# Patient Record
Sex: FEMALE | Race: AMERICAN INDIAN OR ALASKA NATIVE | NOT HISPANIC OR LATINO | URBAN - METROPOLITAN AREA
[De-identification: names, ages, dates, MRNs, and addresses within clinical notes are randomized per-mention and may not be internally consistent; named-entity substitution may affect disease eponyms.]

---

## 2021-05-08 ENCOUNTER — EMERGENCY (EMERGENCY)
Facility: HOSPITAL | Age: 36
LOS: 1 days | Discharge: HOME | End: 2021-05-08
Attending: EMERGENCY MEDICINE | Admitting: EMERGENCY MEDICINE
Payer: COMMERCIAL

## 2021-05-08 VITALS
HEART RATE: 78 BPM | OXYGEN SATURATION: 100 % | RESPIRATION RATE: 18 BRPM | SYSTOLIC BLOOD PRESSURE: 117 MMHG | DIASTOLIC BLOOD PRESSURE: 74 MMHG | TEMPERATURE: 97 F

## 2021-05-08 LAB — SARS-COV-2 RNA SPEC QL NAA+PROBE: SIGNIFICANT CHANGE UP

## 2021-05-08 PROCEDURE — 99284 EMERGENCY DEPT VISIT MOD MDM: CPT

## 2021-05-08 NOTE — ED ADULT TRIAGE NOTE - CHIEF COMPLAINT QUOTE
Arrives from home c/o nasal congestion associated with seasonal allergies unrelieved by claritin. Did not see PCP. Denies PMH

## 2021-05-08 NOTE — ED PROVIDER NOTE - PATIENT PORTAL LINK FT
You can access the FollowMyHealth Patient Portal offered by [facility] by registering at the following website: [email or web address]. By joining [employer]’s FollowMyHealth portal, you will also be able to view your health information using other applications (apps) compatible with our system.

## 2021-05-08 NOTE — ED PROVIDER NOTE - OBJECTIVE STATEMENT
35yo F no sig pmhx pw diffuse weakness, malaise, congestion x one week. Today went to work and felt "weak" "only wanted to rest"   no fever/chills. no n/v/d no dysuria. "itchy eyes" at night. Taking claritin at home, "it feels like my seasonal allergies" no cough. no cp or sob  LMP 4-29-21 denies preg. Had covid and covid vaccine. Denies any change in taste.

## 2021-05-08 NOTE — ED PROVIDER NOTE - PHYSICAL EXAMINATION
Well appearing female.   mmm. non icteric. no jaundice  normal S1-S2  No resp distress. able to speak in full and clear sentences. no wheeze, rales or stridor.  soft nontender abdomen. no  rebound. no guarding. no sign of trauma. no CVAT   no pedal edema. no calf tenderness. normal pulses bilateral feet.

## 2021-05-08 NOTE — ED PROVIDER NOTE - PROGRESS NOTE DETAILS
plan urine hcg, and covid test. dc home w close fu. Pt requesting work note, given. pt comfortable w plan

## 2021-05-08 NOTE — ED ADULT NURSE NOTE - OBJECTIVE STATEMENT
pt received to intake 1, a&ox 4, ambulatory, denies pmh, p/w seasonal allergies unrelieved by Claritin. Pt reports "runny nose," nasal congestion, itchy/watering eyes. Pt breathing even and unlabored on room air. Denies fever, SOB, chest pain, palpitations, dizziness, N/V/D, constipation, numbness, tingling. COVID swab collected and sent. UCG negative. pending dispo.

## 2021-08-16 ENCOUNTER — RESULT REVIEW (OUTPATIENT)
Age: 36
End: 2021-08-16

## 2021-08-17 PROBLEM — Z00.00 ENCOUNTER FOR PREVENTIVE HEALTH EXAMINATION: Status: ACTIVE | Noted: 2021-08-17

## 2021-09-03 ENCOUNTER — ASOB RESULT (OUTPATIENT)
Age: 36
End: 2021-09-03

## 2021-09-03 ENCOUNTER — APPOINTMENT (OUTPATIENT)
Dept: MATERNAL FETAL MEDICINE | Facility: CLINIC | Age: 36
End: 2021-09-03
Payer: COMMERCIAL

## 2021-09-03 DIAGNOSIS — O99.810 ABNORMAL GLUCOSE COMPLICATING PREGNANCY: ICD-10-CM

## 2021-09-03 PROCEDURE — G0108 DIAB MANAGE TRN  PER INDIV: CPT | Mod: 95

## 2021-09-03 RX ORDER — BLOOD-GLUCOSE METER
W/DEVICE KIT MISCELLANEOUS
Qty: 1 | Refills: 0 | Status: ACTIVE | COMMUNITY
Start: 2021-09-03 | End: 1900-01-01

## 2021-09-09 ENCOUNTER — APPOINTMENT (OUTPATIENT)
Dept: MATERNAL FETAL MEDICINE | Facility: CLINIC | Age: 36
End: 2021-09-09
Payer: COMMERCIAL

## 2021-09-09 ENCOUNTER — APPOINTMENT (OUTPATIENT)
Dept: PEDIATRIC MEDICAL GENETICS | Facility: CLINIC | Age: 36
End: 2021-09-09
Payer: COMMERCIAL

## 2021-09-09 ENCOUNTER — ASOB RESULT (OUTPATIENT)
Age: 36
End: 2021-09-09

## 2021-09-09 DIAGNOSIS — Z78.9 OTHER SPECIFIED HEALTH STATUS: ICD-10-CM

## 2021-09-09 DIAGNOSIS — Z83.49 FAMILY HISTORY OF OTHER ENDOCRINE, NUTRITIONAL AND METABOLIC DISEASES: ICD-10-CM

## 2021-09-09 DIAGNOSIS — Z83.3 FAMILY HISTORY OF DIABETES MELLITUS: ICD-10-CM

## 2021-09-09 DIAGNOSIS — O09.521 SUPERVISION OF ELDERLY MULTIGRAVIDA, FIRST TRIMESTER: ICD-10-CM

## 2021-09-09 PROCEDURE — 96040: CPT

## 2021-09-09 PROCEDURE — G0108 DIAB MANAGE TRN  PER INDIV: CPT | Mod: 95

## 2021-09-20 ENCOUNTER — NON-APPOINTMENT (OUTPATIENT)
Age: 36
End: 2021-09-20

## 2021-09-20 ENCOUNTER — APPOINTMENT (OUTPATIENT)
Dept: MATERNAL FETAL MEDICINE | Facility: CLINIC | Age: 36
End: 2021-09-20
Payer: COMMERCIAL

## 2021-09-20 PROCEDURE — G0108 DIAB MANAGE TRN  PER INDIV: CPT | Mod: 95

## 2021-09-22 ENCOUNTER — ASOB RESULT (OUTPATIENT)
Age: 36
End: 2021-09-22

## 2021-09-22 ENCOUNTER — APPOINTMENT (OUTPATIENT)
Dept: ANTEPARTUM | Facility: CLINIC | Age: 36
End: 2021-09-22
Payer: COMMERCIAL

## 2021-09-22 PROCEDURE — 76801 OB US < 14 WKS SINGLE FETUS: CPT

## 2021-09-22 PROCEDURE — 76813 OB US NUCHAL MEAS 1 GEST: CPT

## 2021-09-22 PROCEDURE — 36416 COLLJ CAPILLARY BLOOD SPEC: CPT

## 2021-09-23 ENCOUNTER — NON-APPOINTMENT (OUTPATIENT)
Age: 36
End: 2021-09-23

## 2021-09-27 ENCOUNTER — APPOINTMENT (OUTPATIENT)
Dept: ANTEPARTUM | Facility: CLINIC | Age: 36
End: 2021-09-27
Payer: COMMERCIAL

## 2021-09-27 ENCOUNTER — ASOB RESULT (OUTPATIENT)
Age: 36
End: 2021-09-27

## 2021-09-27 PROCEDURE — 99213 OFFICE O/P EST LOW 20 MIN: CPT | Mod: 95

## 2021-09-30 ENCOUNTER — NON-APPOINTMENT (OUTPATIENT)
Age: 36
End: 2021-09-30

## 2021-10-19 ENCOUNTER — ASOB RESULT (OUTPATIENT)
Age: 36
End: 2021-10-19

## 2021-10-19 ENCOUNTER — APPOINTMENT (OUTPATIENT)
Dept: MATERNAL FETAL MEDICINE | Facility: CLINIC | Age: 36
End: 2021-10-19
Payer: COMMERCIAL

## 2021-10-19 PROCEDURE — G0108 DIAB MANAGE TRN  PER INDIV: CPT | Mod: 95

## 2021-10-25 ENCOUNTER — NON-APPOINTMENT (OUTPATIENT)
Age: 36
End: 2021-10-25

## 2021-11-02 ENCOUNTER — APPOINTMENT (OUTPATIENT)
Dept: PEDIATRIC CARDIOLOGY | Facility: CLINIC | Age: 36
End: 2021-11-02
Payer: COMMERCIAL

## 2021-11-02 PROCEDURE — 76821 MIDDLE CEREBRAL ARTERY ECHO: CPT

## 2021-11-02 PROCEDURE — 99202 OFFICE O/P NEW SF 15 MIN: CPT

## 2021-11-02 PROCEDURE — 76827 ECHO EXAM OF FETAL HEART: CPT

## 2021-11-02 PROCEDURE — 93325 DOPPLER ECHO COLOR FLOW MAPG: CPT | Mod: 59

## 2021-11-02 PROCEDURE — 76820 UMBILICAL ARTERY ECHO: CPT

## 2021-11-02 PROCEDURE — 76825 ECHO EXAM OF FETAL HEART: CPT

## 2021-11-09 ENCOUNTER — ASOB RESULT (OUTPATIENT)
Age: 36
End: 2021-11-09

## 2021-11-09 ENCOUNTER — APPOINTMENT (OUTPATIENT)
Dept: MATERNAL FETAL MEDICINE | Facility: CLINIC | Age: 36
End: 2021-11-09
Payer: COMMERCIAL

## 2021-11-09 PROCEDURE — G0108 DIAB MANAGE TRN  PER INDIV: CPT | Mod: 95

## 2021-11-09 RX ORDER — BLOOD-GLUCOSE METER
KIT MISCELLANEOUS
Qty: 6 | Refills: 1 | Status: ACTIVE | COMMUNITY
Start: 2021-09-03 | End: 1900-01-01

## 2021-11-17 ENCOUNTER — ASOB RESULT (OUTPATIENT)
Age: 36
End: 2021-11-17

## 2021-11-17 ENCOUNTER — APPOINTMENT (OUTPATIENT)
Dept: ANTEPARTUM | Facility: CLINIC | Age: 36
End: 2021-11-17
Payer: COMMERCIAL

## 2021-11-17 PROCEDURE — 76811 OB US DETAILED SNGL FETUS: CPT

## 2021-11-18 DIAGNOSIS — O24.419 GESTATIONAL DIABETES MELLITUS IN PREGNANCY, UNSPECIFIED CONTROL: ICD-10-CM

## 2021-11-18 RX ORDER — PEN NEEDLE, DIABETIC 29 G X1/2"
32G X 4 MM NEEDLE, DISPOSABLE MISCELLANEOUS
Qty: 4 | Refills: 3 | Status: COMPLETED | COMMUNITY
Start: 2021-09-09 | End: 2022-11-13

## 2021-11-22 ENCOUNTER — NON-APPOINTMENT (OUTPATIENT)
Age: 36
End: 2021-11-22

## 2021-11-22 RX ORDER — INSULIN HUMAN 100 [IU]/ML
100 INJECTION, SUSPENSION SUBCUTANEOUS
Qty: 2 | Refills: 1 | Status: ACTIVE | COMMUNITY
Start: 2021-09-09 | End: 1900-01-01

## 2021-12-06 ENCOUNTER — APPOINTMENT (OUTPATIENT)
Dept: MATERNAL FETAL MEDICINE | Facility: CLINIC | Age: 36
End: 2021-12-06
Payer: COMMERCIAL

## 2021-12-06 ENCOUNTER — ASOB RESULT (OUTPATIENT)
Age: 36
End: 2021-12-06

## 2021-12-06 PROCEDURE — G0108 DIAB MANAGE TRN  PER INDIV: CPT | Mod: 95

## 2021-12-06 RX ORDER — LANCETS 33 GAUGE
EACH MISCELLANEOUS
Qty: 2 | Refills: 1 | Status: ACTIVE | COMMUNITY
Start: 2021-09-03 | End: 1900-01-01

## 2021-12-06 RX ORDER — ISOPROPYL ALCOHOL 0.7 ML/ML
SWAB TOPICAL
Qty: 2 | Refills: 3 | Status: ACTIVE | COMMUNITY
Start: 2021-09-09 | End: 1900-01-01

## 2021-12-20 ENCOUNTER — APPOINTMENT (OUTPATIENT)
Dept: MATERNAL FETAL MEDICINE | Facility: CLINIC | Age: 36
End: 2021-12-20
Payer: COMMERCIAL

## 2021-12-20 ENCOUNTER — ASOB RESULT (OUTPATIENT)
Age: 36
End: 2021-12-20

## 2021-12-20 PROCEDURE — G0108 DIAB MANAGE TRN  PER INDIV: CPT | Mod: 95

## 2021-12-28 ENCOUNTER — OUTPATIENT (OUTPATIENT)
Dept: INPATIENT UNIT | Facility: HOSPITAL | Age: 36
LOS: 1 days | Discharge: HOME | End: 2021-12-28
Payer: COMMERCIAL

## 2021-12-28 ENCOUNTER — EMERGENCY (EMERGENCY)
Facility: HOSPITAL | Age: 36
LOS: 1 days | Discharge: STILL ADMITTED OR ADMINISTRATIVE | End: 2021-12-28
Admitting: EMERGENCY MEDICINE
Payer: COMMERCIAL

## 2021-12-28 VITALS
TEMPERATURE: 98 F | SYSTOLIC BLOOD PRESSURE: 109 MMHG | OXYGEN SATURATION: 97 % | RESPIRATION RATE: 20 BRPM | HEART RATE: 85 BPM | DIASTOLIC BLOOD PRESSURE: 66 MMHG

## 2021-12-28 VITALS — SYSTOLIC BLOOD PRESSURE: 111 MMHG | HEART RATE: 78 BPM | DIASTOLIC BLOOD PRESSURE: 67 MMHG

## 2021-12-28 VITALS
SYSTOLIC BLOOD PRESSURE: 113 MMHG | DIASTOLIC BLOOD PRESSURE: 71 MMHG | RESPIRATION RATE: 16 BRPM | TEMPERATURE: 99 F | HEART RATE: 81 BPM

## 2021-12-28 DIAGNOSIS — Z3A.00 WEEKS OF GESTATION OF PREGNANCY NOT SPECIFIED: ICD-10-CM

## 2021-12-28 DIAGNOSIS — O26.899 OTHER SPECIFIED PREGNANCY RELATED CONDITIONS, UNSPECIFIED TRIMESTER: ICD-10-CM

## 2021-12-28 LAB
APPEARANCE UR: CLEAR — SIGNIFICANT CHANGE UP
BACTERIA # UR AUTO: NEGATIVE — SIGNIFICANT CHANGE UP
BILIRUB UR-MCNC: NEGATIVE — SIGNIFICANT CHANGE UP
COLOR SPEC: YELLOW — SIGNIFICANT CHANGE UP
DIFF PNL FLD: NEGATIVE — SIGNIFICANT CHANGE UP
GLUCOSE UR QL: NEGATIVE — SIGNIFICANT CHANGE UP
KETONES UR-MCNC: ABNORMAL
LEUKOCYTE ESTERASE UR-ACNC: NEGATIVE — SIGNIFICANT CHANGE UP
NITRITE UR-MCNC: NEGATIVE — SIGNIFICANT CHANGE UP
PH UR: 6 — SIGNIFICANT CHANGE UP (ref 5–8)
PROT UR-MCNC: ABNORMAL
RBC CASTS # UR COMP ASSIST: 1 /HPF — SIGNIFICANT CHANGE UP (ref 0–4)
SP GR SPEC: 1.02 — SIGNIFICANT CHANGE UP (ref 1–1.05)
UROBILINOGEN FLD QL: SIGNIFICANT CHANGE UP
WBC UR QL: 1 /HPF — SIGNIFICANT CHANGE UP (ref 0–5)

## 2021-12-28 PROCEDURE — 99214 OFFICE O/P EST MOD 30 MIN: CPT | Mod: 25

## 2021-12-28 PROCEDURE — 76818 FETAL BIOPHYS PROFILE W/NST: CPT | Mod: 26

## 2021-12-28 PROCEDURE — L9996: CPT

## 2021-12-28 PROCEDURE — 76817 TRANSVAGINAL US OBSTETRIC: CPT | Mod: 26

## 2021-12-28 NOTE — OB PROVIDER TRIAGE NOTE - NSHPLABSRESULTS_GEN_ALL_CORE
Urinalysis Basic - ( 28 Dec 2021 16:52 )    Color: Yellow / Appearance: Clear / S.020 / pH: x  Gluc: x / Ketone: Large  / Bili: Negative / Urobili: <2 mg/dL   Blood: x / Protein: Trace / Nitrite: Negative   Leuk Esterase: Negative / RBC: 1 /HPF / WBC 1 /HPF   Sq Epi: x / Non Sq Epi: x / Bacteria: Negative

## 2021-12-28 NOTE — OB PROVIDER TRIAGE NOTE - HISTORY OF PRESENT ILLNESS
36 y.o.  FLAQUITO 3/31/2022 @ 26.5 weeks presents with c/o intermittent lower abdominal pain yesterday, now resolved. Pt denies LOF, VB, ctx. States +FM. Antepartum course complicated by pregestational DM2. Pt states occasional constipation. BM yesterday 2021. Denies sexual intercourse in past 24 hours.    allergies:  NKDA  medications:  prenatal vitamins  lispro - 20 units breakfast, 40 units lunch  humulin 22 units HS    med/surg hx:  top x 2  DM2    OBGYN hx:  top x 2 with d+c  top x 1 medical  2005  7lbs  2007  10 lbs

## 2021-12-28 NOTE — OB PROVIDER TRIAGE NOTE - NS_SONOPERFORMEDBY_OBGYN_ALL_OB_FT
8/1/16: LSIL, + HR HPV (Neg 16/18). Plan colp  12/13/16: [place] not done. Tracking updated for 6 mo pap.   07/18/17 Would consider patient to be lost to follow-up.  3/15/19 ASCUS pap, Neg HPV. Plan colp. (MRA)  3/25/19 Pt notified. (shelli)  4/9/19 [place] Bx & ECC - Negative. Plan cotest in 1 year. (shelli)  4/13/19 Pt viewed result on [facility] (shelli)  2/6/20 NIL Pap, Neg HPV. Plan cotest in 3 years. (shelli)  2/13/20 Result released to [facility]. (shelli)   Maritza, NP

## 2021-12-28 NOTE — OB PROVIDER TRIAGE NOTE - NSHPPHYSICALEXAM_GEN_ALL_CORE
abdomen soft, nontender  taus: sliup, breech presentation, posterior placenta, bpp 8/8, regi 11.83  sse: cervix appears closed, FFN collected and pending  tvus: cervical length 4.4, no dynamic changes/funneling noted  nst in progress

## 2021-12-28 NOTE — OB PROVIDER TRIAGE NOTE - NSOBPROVIDERNOTE_OBGYN_ALL_OB_FT
a/p: 36 y.o.  FLAQUITO 3/31/2022 @ 26.5 weeks hx lower abdominal cramping now resolved    UA pending      Discussed with Dr Cesar. Plan for discharge home with  labor precautions/fetal kick counts reviewed. Encouraged increased PO hydration. F/U next scheduled prenatal appointment.     Maritza, NP a/p: 36 y.o.  FLAQUITO 3/31/2022 @ 26.5 weeks hx lower abdominal cramping now resolved    UA pending      Discussed with Dr Cesar. Plan for discharge home with  labor precautions/fetal kick counts reviewed. Encouraged increased PO hydration. F/U next scheduled prenatal appointment. Pt states understanding of the above plan. In total ~1 hour spent with established/new patient.    Maritza, NP

## 2021-12-28 NOTE — OB RN TRIAGE NOTE - FALL HARM RISK - UNIVERSAL INTERVENTIONS
Bed in lowest position, wheels locked, appropriate side rails in place/Call bell, personal items and telephone in reach/Instruct patient to call for assistance before getting out of bed or chair/Non-slip footwear when patient is out of bed/[place] to call system/Physically safe environment - no spills, clutter or unnecessary equipment/Purposeful Proactive Rounding/Room/bathroom lighting operational, light cord in reach

## 2022-01-03 ENCOUNTER — ASOB RESULT (OUTPATIENT)
Age: 37
End: 2022-01-03

## 2022-01-03 ENCOUNTER — APPOINTMENT (OUTPATIENT)
Dept: MATERNAL FETAL MEDICINE | Facility: CLINIC | Age: 37
End: 2022-01-03
Payer: COMMERCIAL

## 2022-01-03 PROCEDURE — G0108 DIAB MANAGE TRN  PER INDIV: CPT | Mod: 95

## 2022-01-04 ENCOUNTER — TRANSCRIPTION ENCOUNTER (OUTPATIENT)
Age: 37
End: 2022-01-04

## 2022-01-04 PROBLEM — E11.9 TYPE 2 DIABETES MELLITUS WITHOUT COMPLICATIONS: Chronic | Status: ACTIVE | Noted: 2021-12-28

## 2022-01-12 ENCOUNTER — ASOB RESULT (OUTPATIENT)
Age: 37
End: 2022-01-12

## 2022-01-12 ENCOUNTER — APPOINTMENT (OUTPATIENT)
Dept: ANTEPARTUM | Facility: CLINIC | Age: 37
End: 2022-01-12
Payer: COMMERCIAL

## 2022-01-12 PROCEDURE — 76816 OB US FOLLOW-UP PER FETUS: CPT

## 2022-01-12 PROCEDURE — 76819 FETAL BIOPHYS PROFIL W/O NST: CPT

## 2022-01-13 ENCOUNTER — ASOB RESULT (OUTPATIENT)
Age: 37
End: 2022-01-13

## 2022-01-13 ENCOUNTER — APPOINTMENT (OUTPATIENT)
Dept: MATERNAL FETAL MEDICINE | Facility: CLINIC | Age: 37
End: 2022-01-13
Payer: COMMERCIAL

## 2022-01-13 PROCEDURE — G0108 DIAB MANAGE TRN  PER INDIV: CPT | Mod: 95

## 2022-01-27 ENCOUNTER — ASOB RESULT (OUTPATIENT)
Age: 37
End: 2022-01-27

## 2022-01-27 ENCOUNTER — APPOINTMENT (OUTPATIENT)
Dept: MATERNAL FETAL MEDICINE | Facility: CLINIC | Age: 37
End: 2022-01-27
Payer: COMMERCIAL

## 2022-01-27 PROCEDURE — G0108 DIAB MANAGE TRN  PER INDIV: CPT | Mod: 95

## 2022-02-14 ENCOUNTER — ASOB RESULT (OUTPATIENT)
Age: 37
End: 2022-02-14

## 2022-02-14 ENCOUNTER — APPOINTMENT (OUTPATIENT)
Dept: MATERNAL FETAL MEDICINE | Facility: CLINIC | Age: 37
End: 2022-02-14

## 2022-02-16 RX ORDER — INSULIN LISPRO 100 [IU]/ML
100 INJECTION, SOLUTION INTRAVENOUS; SUBCUTANEOUS
Qty: 7 | Refills: 1 | Status: ACTIVE | COMMUNITY
Start: 2021-09-20 | End: 1900-01-01

## 2022-02-23 ENCOUNTER — APPOINTMENT (OUTPATIENT)
Dept: ANTEPARTUM | Facility: CLINIC | Age: 37
End: 2022-02-23
Payer: COMMERCIAL

## 2022-02-23 ENCOUNTER — ASOB RESULT (OUTPATIENT)
Age: 37
End: 2022-02-23

## 2022-02-23 PROCEDURE — 76818 FETAL BIOPHYS PROFILE W/NST: CPT

## 2022-02-23 PROCEDURE — 76816 OB US FOLLOW-UP PER FETUS: CPT

## 2022-02-28 ENCOUNTER — APPOINTMENT (OUTPATIENT)
Dept: MATERNAL FETAL MEDICINE | Facility: CLINIC | Age: 37
End: 2022-02-28
Payer: COMMERCIAL

## 2022-02-28 ENCOUNTER — APPOINTMENT (OUTPATIENT)
Dept: ANTEPARTUM | Facility: CLINIC | Age: 37
End: 2022-02-28
Payer: COMMERCIAL

## 2022-02-28 ENCOUNTER — ASOB RESULT (OUTPATIENT)
Age: 37
End: 2022-02-28

## 2022-02-28 PROCEDURE — 76818 FETAL BIOPHYS PROFILE W/NST: CPT

## 2022-02-28 PROCEDURE — G0108 DIAB MANAGE TRN  PER INDIV: CPT | Mod: 95

## 2022-03-07 ENCOUNTER — APPOINTMENT (OUTPATIENT)
Dept: ANTEPARTUM | Facility: CLINIC | Age: 37
End: 2022-03-07
Payer: COMMERCIAL

## 2022-03-07 ENCOUNTER — ASOB RESULT (OUTPATIENT)
Age: 37
End: 2022-03-07

## 2022-03-07 PROCEDURE — 76819 FETAL BIOPHYS PROFIL W/O NST: CPT

## 2022-03-10 ENCOUNTER — APPOINTMENT (OUTPATIENT)
Dept: ANTEPARTUM | Facility: CLINIC | Age: 37
End: 2022-03-10
Payer: COMMERCIAL

## 2022-03-10 ENCOUNTER — APPOINTMENT (OUTPATIENT)
Dept: MATERNAL FETAL MEDICINE | Facility: CLINIC | Age: 37
End: 2022-03-10

## 2022-03-10 ENCOUNTER — ASOB RESULT (OUTPATIENT)
Age: 37
End: 2022-03-10

## 2022-03-10 PROCEDURE — 59025 FETAL NON-STRESS TEST: CPT

## 2022-03-14 ENCOUNTER — ASOB RESULT (OUTPATIENT)
Age: 37
End: 2022-03-14

## 2022-03-14 ENCOUNTER — APPOINTMENT (OUTPATIENT)
Dept: ANTEPARTUM | Facility: CLINIC | Age: 37
End: 2022-03-14
Payer: COMMERCIAL

## 2022-03-14 PROCEDURE — 76818 FETAL BIOPHYS PROFILE W/NST: CPT

## 2022-03-17 ENCOUNTER — ASOB RESULT (OUTPATIENT)
Age: 37
End: 2022-03-17

## 2022-03-17 ENCOUNTER — APPOINTMENT (OUTPATIENT)
Dept: ANTEPARTUM | Facility: CLINIC | Age: 37
End: 2022-03-17
Payer: COMMERCIAL

## 2022-03-17 PROCEDURE — 59025 FETAL NON-STRESS TEST: CPT

## 2022-03-21 ENCOUNTER — ASOB RESULT (OUTPATIENT)
Age: 37
End: 2022-03-21

## 2022-03-21 ENCOUNTER — APPOINTMENT (OUTPATIENT)
Dept: ANTEPARTUM | Facility: CLINIC | Age: 37
End: 2022-03-21
Payer: COMMERCIAL

## 2022-03-21 PROCEDURE — 76818 FETAL BIOPHYS PROFILE W/NST: CPT

## 2022-03-21 PROCEDURE — 76816 OB US FOLLOW-UP PER FETUS: CPT

## 2022-03-22 ENCOUNTER — ASOB RESULT (OUTPATIENT)
Age: 37
End: 2022-03-22

## 2022-03-22 ENCOUNTER — APPOINTMENT (OUTPATIENT)
Dept: MATERNAL FETAL MEDICINE | Facility: CLINIC | Age: 37
End: 2022-03-22
Payer: COMMERCIAL

## 2022-03-22 PROCEDURE — G0108 DIAB MANAGE TRN  PER INDIV: CPT | Mod: 95

## 2022-03-24 ENCOUNTER — INPATIENT (INPATIENT)
Facility: HOSPITAL | Age: 37
LOS: 2 days | Discharge: HOME | End: 2022-03-27
Attending: STUDENT IN AN ORGANIZED HEALTH CARE EDUCATION/TRAINING PROGRAM | Admitting: STUDENT IN AN ORGANIZED HEALTH CARE EDUCATION/TRAINING PROGRAM
Payer: COMMERCIAL

## 2022-03-24 VITALS — TEMPERATURE: 98 F

## 2022-03-24 DIAGNOSIS — O24.410 GESTATIONAL DIABETES MELLITUS IN PREGNANCY, DIET CONTROLLED: ICD-10-CM

## 2022-03-24 LAB
BASOPHILS # BLD AUTO: 0.03 K/UL — SIGNIFICANT CHANGE UP (ref 0–0.2)
BASOPHILS NFR BLD AUTO: 0.4 % — SIGNIFICANT CHANGE UP (ref 0–2)
BLD GP AB SCN SERPL QL: POSITIVE — SIGNIFICANT CHANGE UP
EOSINOPHIL # BLD AUTO: 0.1 K/UL — SIGNIFICANT CHANGE UP (ref 0–0.5)
EOSINOPHIL NFR BLD AUTO: 1.3 % — SIGNIFICANT CHANGE UP (ref 0–6)
GLUCOSE BLDC GLUCOMTR-MCNC: 75 MG/DL — SIGNIFICANT CHANGE UP (ref 70–99)
HCT VFR BLD CALC: 32.7 % — LOW (ref 34.5–45)
HGB BLD-MCNC: 10.2 G/DL — LOW (ref 11.5–15.5)
IANC: 4.54 K/UL — SIGNIFICANT CHANGE UP (ref 1.8–7.4)
IMM GRANULOCYTES NFR BLD AUTO: 1.6 % — HIGH (ref 0–1.5)
LYMPHOCYTES # BLD AUTO: 1.87 K/UL — SIGNIFICANT CHANGE UP (ref 1–3.3)
LYMPHOCYTES # BLD AUTO: 25.2 % — SIGNIFICANT CHANGE UP (ref 13–44)
MCHC RBC-ENTMCNC: 25.8 PG — LOW (ref 27–34)
MCHC RBC-ENTMCNC: 31.2 GM/DL — LOW (ref 32–36)
MCV RBC AUTO: 82.8 FL — SIGNIFICANT CHANGE UP (ref 80–100)
MONOCYTES # BLD AUTO: 0.77 K/UL — SIGNIFICANT CHANGE UP (ref 0–0.9)
MONOCYTES NFR BLD AUTO: 10.4 % — SIGNIFICANT CHANGE UP (ref 2–14)
NEUTROPHILS # BLD AUTO: 4.54 K/UL — SIGNIFICANT CHANGE UP (ref 1.8–7.4)
NEUTROPHILS NFR BLD AUTO: 61.1 % — SIGNIFICANT CHANGE UP (ref 43–77)
NRBC # BLD: 0 /100 WBCS — SIGNIFICANT CHANGE UP
NRBC # FLD: 0 K/UL — SIGNIFICANT CHANGE UP
PLATELET # BLD AUTO: 164 K/UL — SIGNIFICANT CHANGE UP (ref 150–400)
RBC # BLD: 3.95 M/UL — SIGNIFICANT CHANGE UP (ref 3.8–5.2)
RBC # FLD: 14.2 % — SIGNIFICANT CHANGE UP (ref 10.3–14.5)
RH IG SCN BLD-IMP: NEGATIVE — SIGNIFICANT CHANGE UP
RH IG SCN BLD-IMP: NEGATIVE — SIGNIFICANT CHANGE UP
WBC # BLD: 7.43 K/UL — SIGNIFICANT CHANGE UP (ref 3.8–10.5)
WBC # FLD AUTO: 7.43 K/UL — SIGNIFICANT CHANGE UP (ref 3.8–10.5)

## 2022-03-24 PROCEDURE — 86077 PHYS BLOOD BANK SERV XMATCH: CPT

## 2022-03-24 RX ORDER — OXYTOCIN 10 UNIT/ML
333.33 VIAL (ML) INJECTION
Qty: 20 | Refills: 0 | Status: COMPLETED | OUTPATIENT
Start: 2022-03-24 | End: 2022-03-25

## 2022-03-24 RX ORDER — CITRIC ACID/SODIUM CITRATE 300-500 MG
15 SOLUTION, ORAL ORAL EVERY 6 HOURS
Refills: 0 | Status: DISCONTINUED | OUTPATIENT
Start: 2022-03-24 | End: 2022-03-26

## 2022-03-24 RX ORDER — SODIUM CHLORIDE 9 MG/ML
1000 INJECTION, SOLUTION INTRAVENOUS
Refills: 0 | Status: DISCONTINUED | OUTPATIENT
Start: 2022-03-24 | End: 2022-03-27

## 2022-03-24 NOTE — OB PROVIDER H&P - INTERNATIONAL TRAVEL
Right hip dressing clean,dry, intact. Moves all extremities well. Denies numbness, tingling. Has participated with physical therapy. States pain well controlled on oral pain medication, states tolerated activity well. States ready to go home today.   I No

## 2022-03-24 NOTE — OB PROVIDER H&P - ASSESSMENT
Assessment  – IOL for T2DM. GBS neg.    Plan  Admit to LND. Routine Labs.   IVF and glucose monitoring as per diabetic protocol.  IOL w/ PO cytotec   Fetus: cat 1 tracing. Continuous EFM.   Prenatal issues: T2DM   GBS neg  COVID pending     Patient discussed with attending physician, Dr. Antonio Xiong, PGY-1  Assessment  – Pt is a 32  @ 39+2 presenting for rrIOL. GBS neg. EFW 3300    1. Admit to LND. Routine Labs. IVF  2. IOL CB and pitocin  3. Fetus: Cat 1 tracing, Vertex, EFW 3300 . C/w EFM.  4. Covid neg/vaccinated    Soy Rangel PGY2    Patient discussed with attending physician, Dr. Antonio Xiong, PGY-1

## 2022-03-24 NOTE — OB RN PATIENT PROFILE - NSICDXPASTMEDICALHX_GEN_ALL_CORE_FT
PAST MEDICAL HISTORY:  COVID-19 02/21    History of appendectomy Age 11    Type 2 diabetes mellitus without complication

## 2022-03-24 NOTE — OB RN PATIENT PROFILE - FALL HARM RISK - UNIVERSAL INTERVENTIONS
Bed in lowest position, wheels locked, appropriate side rails in place/Call bell, personal items and telephone in reach/Instruct patient to call for assistance before getting out of bed or chair/Non-slip footwear when patient is out of bed/[place] to call system/Physically safe environment - no spills, clutter or unnecessary equipment/Purposeful Proactive Rounding/Room/bathroom lighting operational, light cord in reach

## 2022-03-24 NOTE — OB PROVIDER H&P - ATTENDING COMMENTS
37yoF  at 39w presents for IOL for T2DM. Pt was diagnosed during the first trimester with  early GCT in the setting of known history of prediabetics A1C was 6.4% at that time.  IVF and glucose monitoring as per diabetic protocol.  Admit for IOL w/ PO cytotec  cat 1 tracing 37yoF  at 39w presents for IOL for T2DM. Pt was diagnosed during the first trimester with  early GCT in the setting of known history of prediabetics A1C was 6.4% at that time.  IVF and glucose monitoring as per diabetic protocol.  Admit for IOL w/ PO Cytotec  with cat 1 tracing C Fredo

## 2022-03-25 ENCOUNTER — TRANSCRIPTION ENCOUNTER (OUTPATIENT)
Age: 37
End: 2022-03-25

## 2022-03-25 LAB
COVID-19 SPIKE DOMAIN AB INTERP: POSITIVE
COVID-19 SPIKE DOMAIN ANTIBODY RESULT: >250 U/ML — HIGH
GLUCOSE BLDC GLUCOMTR-MCNC: 102 MG/DL — HIGH (ref 70–99)
GLUCOSE BLDC GLUCOMTR-MCNC: 105 MG/DL — HIGH (ref 70–99)
GLUCOSE BLDC GLUCOMTR-MCNC: 110 MG/DL — HIGH (ref 70–99)
GLUCOSE BLDC GLUCOMTR-MCNC: 84 MG/DL — SIGNIFICANT CHANGE UP (ref 70–99)
GLUCOSE BLDC GLUCOMTR-MCNC: 86 MG/DL — SIGNIFICANT CHANGE UP (ref 70–99)
GLUCOSE BLDC GLUCOMTR-MCNC: 91 MG/DL — SIGNIFICANT CHANGE UP (ref 70–99)
SARS-COV-2 IGG+IGM SERPL QL IA: >250 U/ML — HIGH
SARS-COV-2 IGG+IGM SERPL QL IA: POSITIVE
T PALLIDUM AB TITR SER: NEGATIVE — SIGNIFICANT CHANGE UP

## 2022-03-25 RX ORDER — MORPHINE SULFATE 50 MG/1
4 CAPSULE, EXTENDED RELEASE ORAL ONCE
Refills: 0 | Status: DISCONTINUED | OUTPATIENT
Start: 2022-03-25 | End: 2022-03-25

## 2022-03-25 RX ORDER — BENZOCAINE 10 %
1 GEL (GRAM) MUCOUS MEMBRANE EVERY 6 HOURS
Refills: 0 | Status: DISCONTINUED | OUTPATIENT
Start: 2022-03-25 | End: 2022-03-27

## 2022-03-25 RX ORDER — SODIUM CHLORIDE 9 MG/ML
3 INJECTION INTRAMUSCULAR; INTRAVENOUS; SUBCUTANEOUS EVERY 8 HOURS
Refills: 0 | Status: DISCONTINUED | OUTPATIENT
Start: 2022-03-25 | End: 2022-03-27

## 2022-03-25 RX ORDER — KETOROLAC TROMETHAMINE 30 MG/ML
30 SYRINGE (ML) INJECTION ONCE
Refills: 0 | Status: DISCONTINUED | OUTPATIENT
Start: 2022-03-25 | End: 2022-03-26

## 2022-03-25 RX ORDER — ACETAMINOPHEN 500 MG
975 TABLET ORAL
Refills: 0 | Status: DISCONTINUED | OUTPATIENT
Start: 2022-03-25 | End: 2022-03-27

## 2022-03-25 RX ORDER — IBUPROFEN 200 MG
600 TABLET ORAL EVERY 6 HOURS
Refills: 0 | Status: COMPLETED | OUTPATIENT
Start: 2022-03-25 | End: 2023-02-21

## 2022-03-25 RX ORDER — AER TRAVELER 0.5 G/1
1 SOLUTION RECTAL; TOPICAL EVERY 4 HOURS
Refills: 0 | Status: DISCONTINUED | OUTPATIENT
Start: 2022-03-25 | End: 2022-03-27

## 2022-03-25 RX ORDER — HYDROCORTISONE 1 %
1 OINTMENT (GRAM) TOPICAL EVERY 6 HOURS
Refills: 0 | Status: DISCONTINUED | OUTPATIENT
Start: 2022-03-25 | End: 2022-03-27

## 2022-03-25 RX ORDER — LANOLIN
1 OINTMENT (GRAM) TOPICAL EVERY 6 HOURS
Refills: 0 | Status: DISCONTINUED | OUTPATIENT
Start: 2022-03-25 | End: 2022-03-27

## 2022-03-25 RX ORDER — SIMETHICONE 80 MG/1
80 TABLET, CHEWABLE ORAL EVERY 4 HOURS
Refills: 0 | Status: DISCONTINUED | OUTPATIENT
Start: 2022-03-25 | End: 2022-03-27

## 2022-03-25 RX ORDER — ONDANSETRON 8 MG/1
4 TABLET, FILM COATED ORAL ONCE
Refills: 0 | Status: COMPLETED | OUTPATIENT
Start: 2022-03-25 | End: 2022-03-25

## 2022-03-25 RX ORDER — OXYTOCIN 10 UNIT/ML
333.33 VIAL (ML) INJECTION
Qty: 20 | Refills: 0 | Status: DISCONTINUED | OUTPATIENT
Start: 2022-03-25 | End: 2022-03-27

## 2022-03-25 RX ORDER — MAGNESIUM HYDROXIDE 400 MG/1
30 TABLET, CHEWABLE ORAL
Refills: 0 | Status: DISCONTINUED | OUTPATIENT
Start: 2022-03-25 | End: 2022-03-27

## 2022-03-25 RX ORDER — PRAMOXINE HYDROCHLORIDE 150 MG/15G
1 AEROSOL, FOAM RECTAL EVERY 4 HOURS
Refills: 0 | Status: DISCONTINUED | OUTPATIENT
Start: 2022-03-25 | End: 2022-03-27

## 2022-03-25 RX ORDER — DIPHENHYDRAMINE HCL 50 MG
25 CAPSULE ORAL EVERY 6 HOURS
Refills: 0 | Status: DISCONTINUED | OUTPATIENT
Start: 2022-03-25 | End: 2022-03-27

## 2022-03-25 RX ORDER — TETANUS TOXOID, REDUCED DIPHTHERIA TOXOID AND ACELLULAR PERTUSSIS VACCINE, ADSORBED 5; 2.5; 8; 8; 2.5 [IU]/.5ML; [IU]/.5ML; UG/.5ML; UG/.5ML; UG/.5ML
0.5 SUSPENSION INTRAMUSCULAR ONCE
Refills: 0 | Status: DISCONTINUED | OUTPATIENT
Start: 2022-03-25 | End: 2022-03-27

## 2022-03-25 RX ORDER — DIBUCAINE 1 %
1 OINTMENT (GRAM) RECTAL EVERY 6 HOURS
Refills: 0 | Status: DISCONTINUED | OUTPATIENT
Start: 2022-03-25 | End: 2022-03-27

## 2022-03-25 RX ADMIN — SODIUM CHLORIDE 100 MILLILITER(S): 9 INJECTION, SOLUTION INTRAVENOUS at 07:51

## 2022-03-25 RX ADMIN — MORPHINE SULFATE 4 MILLIGRAM(S): 50 CAPSULE, EXTENDED RELEASE ORAL at 08:36

## 2022-03-25 RX ADMIN — SODIUM CHLORIDE 100 MILLILITER(S): 9 INJECTION, SOLUTION INTRAVENOUS at 23:04

## 2022-03-25 RX ADMIN — MORPHINE SULFATE 4 MILLIGRAM(S): 50 CAPSULE, EXTENDED RELEASE ORAL at 18:29

## 2022-03-25 RX ADMIN — MORPHINE SULFATE 4 MILLIGRAM(S): 50 CAPSULE, EXTENDED RELEASE ORAL at 07:48

## 2022-03-25 RX ADMIN — MORPHINE SULFATE 4 MILLIGRAM(S): 50 CAPSULE, EXTENDED RELEASE ORAL at 14:41

## 2022-03-25 RX ADMIN — ONDANSETRON 4 MILLIGRAM(S): 8 TABLET, FILM COATED ORAL at 19:37

## 2022-03-25 RX ADMIN — MORPHINE SULFATE 4 MILLIGRAM(S): 50 CAPSULE, EXTENDED RELEASE ORAL at 18:27

## 2022-03-25 RX ADMIN — SODIUM CHLORIDE 100 MILLILITER(S): 9 INJECTION, SOLUTION INTRAVENOUS at 02:02

## 2022-03-25 RX ADMIN — MORPHINE SULFATE 4 MILLIGRAM(S): 50 CAPSULE, EXTENDED RELEASE ORAL at 14:45

## 2022-03-25 RX ADMIN — MORPHINE SULFATE 4 MILLIGRAM(S): 50 CAPSULE, EXTENDED RELEASE ORAL at 07:47

## 2022-03-25 NOTE — OB PROVIDER LABOR PROGRESS NOTE - ASSESSMENT
Cervical balloon remains tightly in place  Patient requesting morphine as previously given in AM and helped with painful contractions  Patient refusing epidural  Continue with PO cytotec  Will reassess PRN    d/w MD Dina Gtz NP

## 2022-03-25 NOTE — CHART NOTE - NSCHARTNOTEFT_GEN_A_CORE
Attending Note     Pt feeling contractions     AFVSS  Gen in NAD   ABd soft, gravid  Ext; no c/c/e     SVE cervical balloon in tact  FHTs 125 mod meri no decels + accels   TOCO q 2-4 min     A/P: multip at term IOL for A2GDM   continue po Cytotec and cervical balloon   FS as per protocol   epidural prn     NIDIA Bird MD

## 2022-03-25 NOTE — CHART NOTE - NSCHARTNOTEFT_GEN_A_CORE
NP note     @ 39.1 IOL for T2DM  Currently on PO cytotec  CB placed at 5am      VS  T(C): 36.6 (22 @ 04:01)  HR: 80 (22 @ 06:04)  BP: 129/82 (22 @ 06:04)  RR: 16 (22 @ 23:08)    C/O contraction pain 6/10  Requesting pain medication  Patient declines epidural  Requests IV pain medication at this time- all questions and concerns addressed, patient verbalizes understanding  Morphine 4x4 ordered  Will reassess PRN      d/w MD Fredo Gtz NP NP note     @ 39.1 IOL for T2DM  Currently on PO cytotec  CB placed at 5am      VS  T(C): 36.6 (22 @ 04:01)  HR: 80 (22 @ 06:04)  BP: 129/82 (22 @ 06:04)  RR: 16 (22 @ 23:08)    Category I tracing    C/O contraction pain 6/10  Requesting pain medication  Patient declines epidural  Requests IV pain medication at this time- all questions and concerns addressed, patient verbalizes understanding  Morphine 4x4 ordered  Will reassess PRN      d/w MD Fredo Gtz NP

## 2022-03-25 NOTE — CHART NOTE - NSCHARTNOTEFT_GEN_A_CORE
pt breathing through ctx  ve 6/70/-2  arom clear  fht 150s, mod variability, + accels, occ early decels  toco ctx q 2-3 min    a/p pt stable. fht reassuring  continue current mgmt

## 2022-03-25 NOTE — OB PROVIDER LABOR PROGRESS NOTE - ASSESSMENT
A/P:   37y  @ 39.1wga admitted for rrIOL    #Labor   - s/p PO and CB     #Fetal Wellbeing   - Cat 1    # Issues   - Most recent FS 84, (pt w/ T2DM)    #Pain Control   - Epi called for     Terence Quispe, PGY-1    d/w Dr. Koch    d/w

## 2022-03-25 NOTE — OB PROVIDER LABOR PROGRESS NOTE - ASSESSMENT
- cervical balloon placed  - cont PO cytotec  - cont FS and rotating fluids per routine for T2DM  - EFM Cat 1     Tierra Joe MD PGY2  Plan d/w Dr. Yoo

## 2022-03-25 NOTE — OB PROVIDER LABOR PROGRESS NOTE - NS_OBIHIFHRDETAILS_OBGYN_ALL_OB_FT
145, mod meri, +accels, -decels
140 moderate/+accels/-decels
145/mod/(-)accels/(+)early decels
Baseline 160  Moderate variability. +Accels - Decels

## 2022-03-25 NOTE — OB PROVIDER LABOR PROGRESS NOTE - NS_SUBJECTIVE/OBJECTIVE_OBGYN_ALL_OB_FT
PGY1 Labor & Delivery Progress Note     Pt examined @ 2213 due to increased discomfort     T(C): 36.9 (03-25-22 @ 21:30), Max: 36.9 (03-24-22 @ 23:08)  HR: 80 (03-25-22 @ 22:17) (60 - 101)  BP: 108/58 (03-25-22 @ 22:03) (94/52 - 142/74)  RR: 20 (03-25-22 @ 21:30) (16 - 20)  SpO2: 92% (03-25-22 @ 22:17) (83% - 100%)

## 2022-03-25 NOTE — OB PROVIDER LABOR PROGRESS NOTE - NS_SUBJECTIVE/OBJECTIVE_OBGYN_ALL_OB_FT
Patient seen and examined for contraction pain. Patient requesting pain medication.  VS  T(C): 36.6 (03-25-22 @ 11:59)  HR: 66 (03-25-22 @ 14:19)  BP: 115/70 (03-25-22 @ 14:19)  RR: 16 (03-24-22 @ 23:08)

## 2022-03-25 NOTE — OB PROVIDER LABOR PROGRESS NOTE - ASSESSMENT
Pt with contractions to  frequent for pitocin. Will consider if they space or if patient does not make cervical change.  Bulging bag.    DW: Dr. Zee Xiong, PGY-1

## 2022-03-25 NOTE — OB PROVIDER LABOR PROGRESS NOTE - NS_SUBJECTIVE/OBJECTIVE_OBGYN_ALL_OB_FT
R2 Labor Note     Patient examined for placement of cervical balloon. CB placed 60 cc NS in uterine, 60 in vaginal. Patient tolerated procedure well.

## 2022-03-26 LAB
ANION GAP SERPL CALC-SCNC: 10 MMOL/L — SIGNIFICANT CHANGE UP (ref 7–14)
BUN SERPL-MCNC: 12 MG/DL — SIGNIFICANT CHANGE UP (ref 7–23)
CALCIUM SERPL-MCNC: 8.6 MG/DL — SIGNIFICANT CHANGE UP (ref 8.4–10.5)
CHLORIDE SERPL-SCNC: 110 MMOL/L — HIGH (ref 98–107)
CO2 SERPL-SCNC: 21 MMOL/L — LOW (ref 22–31)
CREAT SERPL-MCNC: 0.68 MG/DL — SIGNIFICANT CHANGE UP (ref 0.5–1.3)
EGFR: 114 ML/MIN/1.73M2 — SIGNIFICANT CHANGE UP
GLUCOSE BLDC GLUCOMTR-MCNC: 115 MG/DL — HIGH (ref 70–99)
GLUCOSE BLDC GLUCOMTR-MCNC: 135 MG/DL — HIGH (ref 70–99)
GLUCOSE BLDC GLUCOMTR-MCNC: 151 MG/DL — HIGH (ref 70–99)
GLUCOSE BLDC GLUCOMTR-MCNC: 95 MG/DL — SIGNIFICANT CHANGE UP (ref 70–99)
GLUCOSE SERPL-MCNC: 139 MG/DL — HIGH (ref 70–99)
KLEIHAUER-BETKE CALCULATION: 0.02 % — SIGNIFICANT CHANGE UP
POTASSIUM SERPL-MCNC: 4.1 MMOL/L — SIGNIFICANT CHANGE UP (ref 3.5–5.3)
POTASSIUM SERPL-SCNC: 4.1 MMOL/L — SIGNIFICANT CHANGE UP (ref 3.5–5.3)
SODIUM SERPL-SCNC: 141 MMOL/L — SIGNIFICANT CHANGE UP (ref 135–145)

## 2022-03-26 RX ORDER — DEXTROSE 50 % IN WATER 50 %
25 SYRINGE (ML) INTRAVENOUS ONCE
Refills: 0 | Status: DISCONTINUED | OUTPATIENT
Start: 2022-03-26 | End: 2022-03-27

## 2022-03-26 RX ORDER — INSULIN NPH HUM/REG INSULIN HM 70-30/ML
22 VIAL (ML) SUBCUTANEOUS
Qty: 0 | Refills: 0 | DISCHARGE

## 2022-03-26 RX ORDER — INSULIN LISPRO 100/ML
VIAL (ML) SUBCUTANEOUS
Refills: 0 | Status: DISCONTINUED | OUTPATIENT
Start: 2022-03-26 | End: 2022-03-26

## 2022-03-26 RX ORDER — METFORMIN HYDROCHLORIDE 850 MG/1
500 TABLET ORAL
Refills: 0 | Status: DISCONTINUED | OUTPATIENT
Start: 2022-03-26 | End: 2022-03-26

## 2022-03-26 RX ORDER — ACETAMINOPHEN 500 MG
3 TABLET ORAL
Qty: 0 | Refills: 0 | DISCHARGE
Start: 2022-03-26

## 2022-03-26 RX ORDER — SODIUM CHLORIDE 9 MG/ML
1000 INJECTION, SOLUTION INTRAVENOUS
Refills: 0 | Status: DISCONTINUED | OUTPATIENT
Start: 2022-03-26 | End: 2022-03-27

## 2022-03-26 RX ORDER — GLUCAGON INJECTION, SOLUTION 0.5 MG/.1ML
1 INJECTION, SOLUTION SUBCUTANEOUS ONCE
Refills: 0 | Status: DISCONTINUED | OUTPATIENT
Start: 2022-03-26 | End: 2022-03-27

## 2022-03-26 RX ORDER — DEXTROSE 50 % IN WATER 50 %
15 SYRINGE (ML) INTRAVENOUS ONCE
Refills: 0 | Status: DISCONTINUED | OUTPATIENT
Start: 2022-03-26 | End: 2022-03-27

## 2022-03-26 RX ORDER — IBUPROFEN 200 MG
1 TABLET ORAL
Qty: 0 | Refills: 0 | DISCHARGE
Start: 2022-03-26

## 2022-03-26 RX ORDER — IBUPROFEN 200 MG
600 TABLET ORAL EVERY 6 HOURS
Refills: 0 | Status: DISCONTINUED | OUTPATIENT
Start: 2022-03-26 | End: 2022-03-27

## 2022-03-26 RX ORDER — DEXTROSE 50 % IN WATER 50 %
12.5 SYRINGE (ML) INTRAVENOUS ONCE
Refills: 0 | Status: DISCONTINUED | OUTPATIENT
Start: 2022-03-26 | End: 2022-03-27

## 2022-03-26 RX ORDER — OXYCODONE HYDROCHLORIDE 5 MG/1
5 TABLET ORAL
Refills: 0 | Status: DISCONTINUED | OUTPATIENT
Start: 2022-03-26 | End: 2022-03-27

## 2022-03-26 RX ORDER — METFORMIN HYDROCHLORIDE 850 MG/1
500 TABLET ORAL
Refills: 0 | Status: DISCONTINUED | OUTPATIENT
Start: 2022-03-26 | End: 2022-03-27

## 2022-03-26 RX ADMIN — METFORMIN HYDROCHLORIDE 500 MILLIGRAM(S): 850 TABLET ORAL at 17:18

## 2022-03-26 RX ADMIN — Medication 975 MILLIGRAM(S): at 22:41

## 2022-03-26 RX ADMIN — OXYCODONE HYDROCHLORIDE 5 MILLIGRAM(S): 5 TABLET ORAL at 14:42

## 2022-03-26 RX ADMIN — SODIUM CHLORIDE 3 MILLILITER(S): 9 INJECTION INTRAMUSCULAR; INTRAVENOUS; SUBCUTANEOUS at 13:58

## 2022-03-26 RX ADMIN — Medication 975 MILLIGRAM(S): at 04:04

## 2022-03-26 RX ADMIN — Medication 975 MILLIGRAM(S): at 16:47

## 2022-03-26 RX ADMIN — Medication 600 MILLIGRAM(S): at 18:34

## 2022-03-26 RX ADMIN — Medication 975 MILLIGRAM(S): at 23:15

## 2022-03-26 RX ADMIN — Medication 975 MILLIGRAM(S): at 03:34

## 2022-03-26 RX ADMIN — Medication 975 MILLIGRAM(S): at 11:11

## 2022-03-26 RX ADMIN — Medication 600 MILLIGRAM(S): at 08:17

## 2022-03-26 RX ADMIN — Medication 975 MILLIGRAM(S): at 16:17

## 2022-03-26 RX ADMIN — Medication 600 MILLIGRAM(S): at 08:47

## 2022-03-26 RX ADMIN — Medication 975 MILLIGRAM(S): at 11:41

## 2022-03-26 RX ADMIN — OXYCODONE HYDROCHLORIDE 5 MILLIGRAM(S): 5 TABLET ORAL at 14:12

## 2022-03-26 RX ADMIN — Medication 1 TABLET(S): at 12:54

## 2022-03-26 RX ADMIN — Medication 1000 MILLIUNIT(S)/MIN: at 00:23

## 2022-03-26 RX ADMIN — Medication 30 MILLIGRAM(S): at 00:44

## 2022-03-26 NOTE — DISCHARGE NOTE OB - CARE PLAN
Assessment and plan of treatment:	f/u in 6wks   1 Principal Discharge DX:	Vaginal delivery  Assessment and plan of treatment:	f/u in 6wks

## 2022-03-26 NOTE — OB RN DELIVERY SUMMARY - NS_SEPSISRSKCALC_OBGYN_ALL_OB_FT
EOS calculated successfully. EOS Risk Factor: 0.5/1000 live births ([facility] national incidence); GA=39w1d; Temp=98.42; ROM=2; GBS='Negative'; Antibiotics='No antibiotics or any antibiotics < 2 hrs prior to birth'

## 2022-03-26 NOTE — DISCHARGE NOTE OB - CARE PROVIDER_API CALL
Merlyn Cesar)  OBSGYN  Dept Director  [address] [place] [address] [address]  [place] [address]  Phone: [phone]  Fax: [phone]  Established Patient  Follow Up Time: Routine

## 2022-03-26 NOTE — DISCHARGE NOTE OB - PATIENT PORTAL LINK FT
You can access the FollowMyHealth Patient Portal offered by [facility] by registering at the following website: [email or web address]. By joining [employer]’s FollowMyHealth portal, you will also be able to view your health information using other applications (apps) compatible with our system.

## 2022-03-26 NOTE — OB RN DELIVERY SUMMARY - NSSELHIDDEN_OBGYN_ALL_OB_FT
[NS_DeliveryAttending1_OBGYN_ALL_OB_FT:[id number]],[NS_DeliveryRN_OBGYN_ALL_OB_FT:[id number]]

## 2022-03-26 NOTE — DISCHARGE NOTE OB - MEDICATION SUMMARY - MEDICATIONS TO TAKE
I will START or STAY ON the medications listed below when I get home from the hospital:    acetaminophen 325 mg oral tablet  -- 3 tab(s) by mouth every 8 hours, As Needed  -- Indication: For pain    ibuprofen 600 mg oral tablet  -- 1 tab(s) by mouth every 6 hours  -- Indication: For pain    Prenatal Multivitamins with Folic Acid 1 mg oral tablet  -- 1 tab(s) by mouth once a day  -- Indication: For supplement   I will START or STAY ON the medications listed below when I get home from the hospital:    acetaminophen 325 mg oral tablet  -- 3 tab(s) by mouth every 8 hours, As Needed  -- Indication: For pain    ibuprofen 600 mg oral tablet  -- 1 tab(s) by mouth every 6 hours  -- Indication: For pain    metFORMIN 500 mg oral tablet  -- 1 tab(s) by mouth 2 times a day  -- Indication: For for type 2 diabetes    Prenatal Multivitamins with Folic Acid 1 mg oral tablet  -- 1 tab(s) by mouth once a day  -- Indication: For supplement
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
## 2022-03-26 NOTE — PROGRESS NOTE ADULT - SUBJECTIVE AND OBJECTIVE BOX
S: 36yo  PPD#1 s/p . Patient feels well. Pain is well controlled. She is tolerating a regular diet and passing flatus. She is voiding spontaneously, and ambulating without difficulty. Denies CP/SOB. Denies lightheadedness/dizziness. Denies N/V.    O:  Vitals:  Vital Signs Last 24 Hrs  T(C): 36.7 (26 Mar 2022 02:00), Max: 36.9 (25 Mar 2022 21:30)  T(F): 98.1 (26 Mar 2022 02:00), Max: 98.42 (25 Mar 2022 21:30)  HR: 69 (26 Mar 2022 02:00) (60 - 119)  BP: 113/64 (26 Mar 2022 02:00) (90/54 - 151/83)  BP(mean): --  RR: 19 (26 Mar 2022 02:00) (17 - 20)  SpO2: 98% (26 Mar 2022 02:00) (83% - 100%)    MEDICATIONS  (STANDING):  acetaminophen     Tablet .. 975 milliGRAM(s) Oral <User Schedule>  dextrose 5% + lactated ringers. 1000 milliLiter(s) (100 mL/Hr) IV Continuous <Continuous>  diphtheria/tetanus/pertussis (acellular) Vaccine (ADAcel) 0.5 milliLiter(s) IntraMuscular once  ibuprofen  Tablet. 600 milliGRAM(s) Oral every 6 hours  lactated ringers. 1000 milliLiter(s) (100 mL/Hr) IV Continuous <Continuous>  oxytocin Infusion 333.333 milliUNIT(s)/Min (1000 mL/Hr) IV Continuous <Continuous>  prenatal multivitamin 1 Tablet(s) Oral daily  sodium chloride 0.9% lock flush 3 milliLiter(s) IV Push every 8 hours      Labs:  Blood type: AB Negative  Rubella IgG: RPR: Negative                          10.2<L>   7.43 >-----------< 164    (  @ 22:32 )             32.7<L>                  Physical Exam:  General: NAD  Abdomen: soft, non-tender, non-distended, fundus firm and below the umbillicus   Vaginal: Lochia wnl  Extremities: No erythema/edema. No calf tenderness

## 2022-03-26 NOTE — OB PROVIDER DELIVERY SUMMARY - NSSELHIDDEN_OBGYN_ALL_OB_FT
[NS_DeliveryAttending1_OBGYN_ALL_OB_FT:[email or web address]],[NS_DeliveryAssist1_OBGYN_ALL_OB_FT:[id number]=]

## 2022-03-26 NOTE — DISCHARGE NOTE OB - NS MD DC FALL RISK RISK
For information on Fall & Injury Prevention, visit: [email or web address] OR  [email or web address] OR  https://www.cdc.gov/steadi/patient.html

## 2022-03-26 NOTE — DISCHARGE NOTE OB - MEDICATION SUMMARY - MEDICATIONS TO STOP TAKING
I will STOP taking the medications listed below when I get home from the hospital:  None I will STOP taking the medications listed below when I get home from the hospital:    lispro  -- 20 u breakfast  40 u lunch    HumuLIN 50/50 subcutaneous suspension  -- 22  subcutaneous once a day (at bedtime)

## 2022-03-26 NOTE — PROGRESS NOTE ADULT - ATTENDING COMMENTS
Attending note   Agree with above  PPD 1 s/p  c/w diabetes  doing well   no complaints   pain well controlled  voiding freely   tolerating po   abd soft, fundus firm   a/P PPD 1 s/p   routine pp care  consult endocrine for glucose management     NIDIA Bird MD

## 2022-03-26 NOTE — CHART NOTE - NSCHARTNOTEFT_GEN_A_CORE
37F with hx of T2DM, dx first trimester of pregnancy, now postpartum. Was on Metformin prior to pregnancy for preDM. FS post partum are well controlled. Can d/c with metformin 500 mg BID and increase to 1000 mg BID as tolerated as an outpatient.     While inpatient - can start Metformin 500 mg BID and monitor FS.     Please call back with further questions. Discussed with OB team    Ela Yan MD  Endocrine Fellow  Can be reached via teams. For follow up questions, discharge recommendations, or new consults, please call answering service at [phone] (weekdays); [phone] (nights/weekends)

## 2022-03-26 NOTE — PROGRESS NOTE ADULT - ASSESSMENT
A/P: 36yo PPD#1 s/p .  Patient is stable and doing well post-partum.     # T2DM   - Fingersticks well controlled overnight.   - Was controlled on Humalin 26uQHS and 45u and 55u of Lispro for breakfast / dinner during pregnancy   - Appreciate endocrine recommendations for post partum glucose managment.     #PP care  - Pain well controlled, continue current pain regimen  - Increase ambulation, SCDs when not ambulating  - Continue regular diet    Marylin Xiong MD  PGY-1

## 2022-03-26 NOTE — DISCHARGE NOTE OB - MEDICATION SUMMARY - MEDICATIONS TO CHANGE
I will SWITCH the dose or number of times a day I take the medications listed below when I get home from the hospital:    lispro  -- 20 u breakfast  40 u lunch    HumuLIN 50/50 subcutaneous suspension  -- 22  subcutaneous once a day (at bedtime)   I will SWITCH the dose or number of times a day I take the medications listed below when I get home from the hospital:  None

## 2022-03-27 VITALS
TEMPERATURE: 98 F | OXYGEN SATURATION: 100 % | SYSTOLIC BLOOD PRESSURE: 120 MMHG | DIASTOLIC BLOOD PRESSURE: 66 MMHG | HEART RATE: 87 BPM | RESPIRATION RATE: 17 BRPM

## 2022-03-27 LAB — GLUCOSE BLDC GLUCOMTR-MCNC: 94 MG/DL — SIGNIFICANT CHANGE UP (ref 70–99)

## 2022-03-27 RX ORDER — METFORMIN HYDROCHLORIDE 850 MG/1
1 TABLET ORAL
Qty: 0 | Refills: 0 | DISCHARGE
Start: 2022-03-27

## 2022-03-27 RX ORDER — METFORMIN HYDROCHLORIDE 850 MG/1
1 TABLET ORAL
Qty: 60 | Refills: 0
Start: 2022-03-27 | End: 2022-04-25

## 2022-03-27 RX ADMIN — Medication 975 MILLIGRAM(S): at 08:25

## 2022-03-27 RX ADMIN — METFORMIN HYDROCHLORIDE 500 MILLIGRAM(S): 850 TABLET ORAL at 08:24

## 2022-03-27 RX ADMIN — Medication 600 MILLIGRAM(S): at 00:39

## 2022-03-27 RX ADMIN — Medication 975 MILLIGRAM(S): at 08:55

## 2022-03-27 RX ADMIN — Medication 600 MILLIGRAM(S): at 05:18

## 2022-03-27 RX ADMIN — Medication 600 MILLIGRAM(S): at 00:09

## 2022-03-27 RX ADMIN — Medication 600 MILLIGRAM(S): at 06:13

## 2022-03-27 NOTE — PROGRESS NOTE ADULT - SUBJECTIVE AND OBJECTIVE BOX
Attending Note    PPD 2 s/p    doing well   no complaints   pain adequately controlled   voiding freely   tolerating po     Vital Signs Last 24 Hrs  T(C): 36.7 (27 Mar 2022 06:10), Max: 37.2 (26 Mar 2022 18:00)  T(F): 98 (27 Mar 2022 06:10), Max: 98.9 (26 Mar 2022 18:00)  HR: 81 (27 Mar 2022 06:10) (74 - 90)  BP: 101/62 (27 Mar 2022 06:10) (94/63 - 108/67)  BP(mean): --  RR: 17 (27 Mar 2022 06:10) (17 - 18)  SpO2: 99% (27 Mar 2022 06:10) (99% - 100%)  Gen in NAD   Abd soft, fundus firm nontender  Perineum healing well   Ext: no c/c/e     A/P: PPD 2 s/p  c/w T2GDM   appreciate endocrine recs, will d/c home on metformin 500 mg BID and f/u with outpatient endocrinolgoist   routine pp care  d/c home today     NIDIA Bird MD

## 2022-03-27 NOTE — PROGRESS NOTE ADULT - SUBJECTIVE AND OBJECTIVE BOX
OB Progress Note:  PPD #2    S: 38yo  now PPD#2 s/p . Patient feels well. Pain is well controlled. She is tolerating a regular diet and passing flatus. She is voiding spontaneously, and ambulating without difficulty. Denies CP/SOB. Denies lightheadedness/dizziness. Denies N/V.    O:  Vitals:  Vital Signs Last 24 Hrs  T(C): 36.7 (27 Mar 2022 06:10), Max: 37.2 (26 Mar 2022 18:00)  T(F): 98 (27 Mar 2022 06:10), Max: 98.9 (26 Mar 2022 18:00)  HR: 81 (27 Mar 2022 06:10) (74 - 90)  BP: 101/62 (27 Mar 2022 06:10) (94/63 - 108/67)  BP(mean): --  RR: 17 (27 Mar 2022 06:10) (17 - 18)  SpO2: 99% (27 Mar 2022 06:10) (99% - 100%)    MEDICATIONS  (STANDING):  acetaminophen     Tablet .. 975 milliGRAM(s) Oral <User Schedule>  dextrose 5% + lactated ringers. 1000 milliLiter(s) (100 mL/Hr) IV Continuous <Continuous>  dextrose 5%. 1000 milliLiter(s) (50 mL/Hr) IV Continuous <Continuous>  dextrose 5%. 1000 milliLiter(s) (100 mL/Hr) IV Continuous <Continuous>  dextrose 50% Injectable 25 Gram(s) IV Push once  dextrose 50% Injectable 12.5 Gram(s) IV Push once  dextrose 50% Injectable 25 Gram(s) IV Push once  diphtheria/tetanus/pertussis (acellular) Vaccine (ADAcel) 0.5 milliLiter(s) IntraMuscular once  glucagon  Injectable 1 milliGRAM(s) IntraMuscular once  ibuprofen  Tablet. 600 milliGRAM(s) Oral every 6 hours  lactated ringers. 1000 milliLiter(s) (100 mL/Hr) IV Continuous <Continuous>  metFORMIN 500 milliGRAM(s) Oral two times a day  oxytocin Infusion 333.333 milliUNIT(s)/Min (1000 mL/Hr) IV Continuous <Continuous>  prenatal multivitamin 1 Tablet(s) Oral daily  sodium chloride 0.9% lock flush 3 milliLiter(s) IV Push every 8 hours      Labs:  Blood type: AB Negative  Rubella IgG: RPR: Negative                          10.2<L>   7.43 >-----------< 164    (  @ 22:32 )             32.7<L>    22 @ 16:38      141  |  110<H>  |  12  ----------------------------<  139<H>  4.1   |  21<L>  |  0.68        Ca    8.6      26 Mar 2022 16:38            Physical Exam:  General: NAD  Abdomen: Soft, non-tender, non-distended, fundus firm  Vaginal: Lochia wnl  Extremities: No erythema/edema    A/P: 38yo PPD #2 s/p .  Patient is stable and doing well post-partum.     # T2DM   - Fingersticks well controlled overnight: <115  - Was controlled on Humalin 26uQHS and 45u and 55u of Lispro for breakfast / dinner during pregnancy   - s/p endocrine recommended Metformin 500BID, can be up titrated to 1000mg BID if sugars are not well controlled.     #PP care  - Pain well controlled, continue current pain regimen.   - Standing Ibuprofen, Acetaminophen. Oxycodone available PRN for breakthrough pain.  - Increase ambulation, SCDs when not ambulating  - Continue regular diet    Amyeo Afroz Jereen, PGY-1 OB Progress Note:  PPD #2    S: 38yo  now PPD#2 s/p . Patient feels well. Pain is well controlled. She is tolerating a regular diet and passing flatus. She is voiding spontaneously, and ambulating without difficulty. Denies CP/SOB. Denies lightheadedness/dizziness. Denies N/V.    O:  Vitals:  Vital Signs Last 24 Hrs  T(C): 36.7 (27 Mar 2022 06:10), Max: 37.2 (26 Mar 2022 18:00)  T(F): 98 (27 Mar 2022 06:10), Max: 98.9 (26 Mar 2022 18:00)  HR: 81 (27 Mar 2022 06:10) (74 - 90)  BP: 101/62 (27 Mar 2022 06:10) (94/63 - 108/67)  BP(mean): --  RR: 17 (27 Mar 2022 06:10) (17 - 18)  SpO2: 99% (27 Mar 2022 06:10) (99% - 100%)    MEDICATIONS  (STANDING):  acetaminophen     Tablet .. 975 milliGRAM(s) Oral <User Schedule>  dextrose 5% + lactated ringers. 1000 milliLiter(s) (100 mL/Hr) IV Continuous <Continuous>  dextrose 5%. 1000 milliLiter(s) (50 mL/Hr) IV Continuous <Continuous>  dextrose 5%. 1000 milliLiter(s) (100 mL/Hr) IV Continuous <Continuous>  dextrose 50% Injectable 25 Gram(s) IV Push once  dextrose 50% Injectable 12.5 Gram(s) IV Push once  dextrose 50% Injectable 25 Gram(s) IV Push once  diphtheria/tetanus/pertussis (acellular) Vaccine (ADAcel) 0.5 milliLiter(s) IntraMuscular once  glucagon  Injectable 1 milliGRAM(s) IntraMuscular once  ibuprofen  Tablet. 600 milliGRAM(s) Oral every 6 hours  lactated ringers. 1000 milliLiter(s) (100 mL/Hr) IV Continuous <Continuous>  metFORMIN 500 milliGRAM(s) Oral two times a day  oxytocin Infusion 333.333 milliUNIT(s)/Min (1000 mL/Hr) IV Continuous <Continuous>  prenatal multivitamin 1 Tablet(s) Oral daily  sodium chloride 0.9% lock flush 3 milliLiter(s) IV Push every 8 hours      Labs:  Blood type: AB Negative  Rubella IgG: RPR: Negative                          10.2<L>   7.43 >-----------< 164    (  @ 22:32 )             32.7<L>    22 @ 16:38      141  |  110<H>  |  12  ----------------------------<  139<H>  4.1   |  21<L>  |  0.68        Ca    8.6      26 Mar 2022 16:38            Physical Exam:  General: NAD  Abdomen: Soft, non-tender, non-distended, fundus firm  Vaginal: Lochia wnl  Extremities: No erythema/edema    A/P: 38yo PPD #2 s/p .  Patient is stable and doing well post-partum.     # T2DM   - Fingersticks well controlled overnight: <151  - Was controlled on Humalin 26uQHS and 45u and 55u of Lispro for breakfast / dinner during pregnancy   - s/p endocrine recommended Metformin 500BID, can be up titrated to 1000mg BID if sugars are not well controlled.     #PP care  - Pain well controlled, continue current pain regimen.   - Standing Ibuprofen, Acetaminophen. Oxycodone available PRN for breakthrough pain.  - Increase ambulation, SCDs when not ambulating  - Continue regular diet    Amyeo Afroz Jereen, PGY-1

## 2022-04-09 NOTE — OB RN TRIAGE NOTE - [id number]_FT
MS:  No myalgia, muscle weakness, +left ankle pain/swelling   Neuro:  No headache or weakness.  No LOC.  Skin:  No skin rash. bs

## 2022-05-06 ENCOUNTER — LABORATORY RESULT (OUTPATIENT)
Age: 37
End: 2022-05-06

## 2022-05-11 ENCOUNTER — NON-APPOINTMENT (OUTPATIENT)
Age: 37
End: 2022-05-11

## 2023-07-01 PROBLEM — U07.1 COVID-19: Chronic | Status: ACTIVE | Noted: 2022-03-24

## 2023-07-01 PROBLEM — Z90.49 ACQUIRED ABSENCE OF OTHER SPECIFIED PARTS OF DIGESTIVE TRACT: Chronic | Status: ACTIVE | Noted: 2022-03-24

## 2023-07-05 ENCOUNTER — APPOINTMENT (OUTPATIENT)
Dept: MAMMOGRAPHY | Facility: IMAGING CENTER | Age: 38
End: 2023-07-05
Payer: COMMERCIAL

## 2023-07-05 ENCOUNTER — OUTPATIENT (OUTPATIENT)
Dept: OUTPATIENT SERVICES | Facility: HOSPITAL | Age: 38
LOS: 1 days | End: 2023-07-05
Payer: COMMERCIAL

## 2023-07-05 DIAGNOSIS — Z00.8 ENCOUNTER FOR OTHER GENERAL EXAMINATION: ICD-10-CM

## 2023-07-05 PROCEDURE — 77063 BREAST TOMOSYNTHESIS BI: CPT | Mod: 26

## 2023-07-05 PROCEDURE — 77063 BREAST TOMOSYNTHESIS BI: CPT

## 2023-07-05 PROCEDURE — 77067 SCR MAMMO BI INCL CAD: CPT

## 2023-07-05 PROCEDURE — 77067 SCR MAMMO BI INCL CAD: CPT | Mod: 26

## 2024-07-10 ENCOUNTER — OUTPATIENT (OUTPATIENT)
Dept: OUTPATIENT SERVICES | Facility: HOSPITAL | Age: 39
LOS: 1 days | End: 2024-07-10
Payer: COMMERCIAL

## 2024-07-10 ENCOUNTER — APPOINTMENT (OUTPATIENT)
Dept: MAMMOGRAPHY | Facility: IMAGING CENTER | Age: 39
End: 2024-07-10
Payer: COMMERCIAL

## 2024-07-10 DIAGNOSIS — Z00.8 ENCOUNTER FOR OTHER GENERAL EXAMINATION: ICD-10-CM

## 2024-07-10 PROCEDURE — 77063 BREAST TOMOSYNTHESIS BI: CPT | Mod: 26

## 2024-07-10 PROCEDURE — 77067 SCR MAMMO BI INCL CAD: CPT

## 2024-07-10 PROCEDURE — 77067 SCR MAMMO BI INCL CAD: CPT | Mod: 26

## 2024-07-10 PROCEDURE — 77063 BREAST TOMOSYNTHESIS BI: CPT

## 2025-07-22 ENCOUNTER — APPOINTMENT (OUTPATIENT)
Dept: MAMMOGRAPHY | Facility: IMAGING CENTER | Age: 40
End: 2025-07-22
Payer: COMMERCIAL

## 2025-07-22 ENCOUNTER — OUTPATIENT (OUTPATIENT)
Dept: OUTPATIENT SERVICES | Facility: HOSPITAL | Age: 40
LOS: 1 days | End: 2025-07-22
Payer: COMMERCIAL

## 2025-07-22 DIAGNOSIS — Z00.8 ENCOUNTER FOR OTHER GENERAL EXAMINATION: ICD-10-CM

## 2025-07-22 PROCEDURE — 77063 BREAST TOMOSYNTHESIS BI: CPT | Mod: 26

## 2025-07-22 PROCEDURE — 77067 SCR MAMMO BI INCL CAD: CPT

## 2025-07-22 PROCEDURE — 77067 SCR MAMMO BI INCL CAD: CPT | Mod: 26

## 2025-07-22 PROCEDURE — 77063 BREAST TOMOSYNTHESIS BI: CPT
